# Patient Record
Sex: FEMALE | Race: WHITE | NOT HISPANIC OR LATINO | Employment: FULL TIME | URBAN - METROPOLITAN AREA
[De-identification: names, ages, dates, MRNs, and addresses within clinical notes are randomized per-mention and may not be internally consistent; named-entity substitution may affect disease eponyms.]

---

## 2022-06-16 ENCOUNTER — TELEPHONE (OUTPATIENT)
Dept: PEDIATRICS | Facility: OTHER | Age: 37
End: 2022-06-16

## 2022-06-16 NOTE — TELEPHONE ENCOUNTER
----- Message from YANIRA Britt sent at 12/11/2019  3:20 PM EST -----  Hepatitis panel normal, negative, liver enzymes still elevated but improved since last checked. Suspect due to fatty liver seen on CT abdomen last year. Enc healthy diet and regular exercise and we can monitor   Caller: Ana Manjarrez    Relationship to patient: Self    Best call back number:[phone]    Chief complaint: RIGHT FACIAL NUMBNESS AND TINGLING ALSO RIGHT ARM NUMBNESS AND TINGLING  Patient directed to call 911 or go to their nearest emergency room.     Patient verbalized understanding: [x] Yes  [] No  If no, why?    Additional notes:PATIENT CALLED TO MAKE A NEW PATIENT APPOINTMENT WITH SYMPTOMS OF RIGHT SIDE FACE AND ARM NUMBNESS AND TINGLING X FEW DAYS. ADVISED TO GO TO ED AND CALL US TO FOLLOW UP WITH A PROVIDER. PATIENT AGREED.